# Patient Record
Sex: MALE | Race: WHITE | NOT HISPANIC OR LATINO | Employment: FULL TIME | URBAN - METROPOLITAN AREA
[De-identification: names, ages, dates, MRNs, and addresses within clinical notes are randomized per-mention and may not be internally consistent; named-entity substitution may affect disease eponyms.]

---

## 2022-08-16 ENCOUNTER — OCCUPATIONAL MEDICINE (OUTPATIENT)
Dept: URGENT CARE | Facility: CLINIC | Age: 62
End: 2022-08-16
Payer: COMMERCIAL

## 2022-08-16 VITALS
OXYGEN SATURATION: 97 % | BODY MASS INDEX: 25.41 KG/M2 | DIASTOLIC BLOOD PRESSURE: 64 MMHG | TEMPERATURE: 98.2 F | RESPIRATION RATE: 16 BRPM | SYSTOLIC BLOOD PRESSURE: 110 MMHG | HEART RATE: 74 BPM | HEIGHT: 72 IN | WEIGHT: 187.6 LBS

## 2022-08-16 DIAGNOSIS — S39.012A BACK STRAIN, INITIAL ENCOUNTER: ICD-10-CM
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
## 2022-08-16 DIAGNOSIS — M62.830 BACK MUSCLE SPASM: ICD-10-CM

## 2022-08-16 PROCEDURE — 99203 OFFICE O/P NEW LOW 30 MIN: CPT | Performed by: FAMILY MEDICINE

## 2022-08-16 NOTE — LETTER
[place] [address] [facility] [address] [place] [address]  Phone:  [phone] - Fax:  [phone]   [facility] Progress Report and Disability Certification  Date of Service: 8/16/2022   No Show:  No  Date / Time of Next Visit: 8/23/2022    Claim Information   Patient Name: Meliton Montiel  Claim Number:     Employer:   [employer] Date of Injury: 8/15/2022     Insurer / TPA: Ceasar Shetty  ID / SSN:     Occupation: Appliance tech  Diagnosis: Diagnoses of Work related injury, Back muscle spasm, and Back strain, initial encounter were pertinent to this visit.    Medical Information   Related to Industrial Injury? Yes    Subjective Complaints:  DOI: 8/15/2022  ALTA: He was bent over at work, swinging a hammer which he developed sudden low, left sided back pain. The pain is constant, it's described as stabbing and dully, currently rated 4/10. He hasn't tired anything for the pain. No numbness or weakness to his extremities x4. No loss of bowel or bladder function. Does have prior back injury, fell off some tall racks at work in 1980s. Treated with conservative measures. No secondary employment.    Objective Findings: No discolorations or deformities noted to inspection of back.  No step-offs or areas of tenderness to palpation of spine.  He is tender to palpation on his right, lower paraspinal region.  Equal strength and sensation to extremities x4.  Normal gait.   Pre-Existing Condition(s):     Assessment:   Initial Visit    Status: Additional Care Required  Permanent Disability:No    Plan:      Diagnostics:      Comments:  -Work restrictions include 0 hours of pushing/pulling daily and maximum lifting 10 lbs  -Heat, Tylenol, and ibuprofen as needed for symptomatic relief  -Perform the provided exercises 2 times daily    Disability Information   Status: Released to Restricted Duty    From:  8/16/2022  Through: 8/23/2022 Restrictions are: Temporary   Physical  Restrictions   Sitting:    Standing:    Stooping:    Bending:      Squatting:    Walking:    Climbing:    Pushin hrs/day   Pullin hrs/day Other:    Reaching Above Shoulder (L):   Reaching Above Shoulder (R):       Reaching Below Shoulder (L):    Reaching Below Shoulder (R):      Not to exceed Weight Limits   Carrying(hrs):   Weight Limit(lb): < or = to 10 pounds Lifting(hrs):   Weight  Limit(lb): < or = to 10 pounds   Comments:      Repetitive Actions   Hands: i.e. Fine Manipulations from Grasping:     Feet: i.e. Operating Foot Controls:     Driving / Operate Machinery:     Health Care Provider’s Original or Electronic Signature  Ximena Viveros M.D. Health Care Provider’s Original or Electronic Signature    Eliseo Rubio MD         Clinic Name / Location: [address] [address] Clinic Phone Number: Dept: [phone]   Appointment Time: 11:00 Am Visit Start Time: 11:30 AM   Check-In Time:  11:20 Am Visit Discharge Time:  11:59 AM   Original-Treating Physician or Chiropractor    Page 2-Insurer/TPA    Page 3-Employer    Page 4-Employee

## 2022-08-16 NOTE — PROGRESS NOTES
Subjective:     61 y.o. male presents for Other (NEW WC DOI: 8/15/22 Pt has back pain, numbness )      DOI: 8/15/2022  ALTA: He was bent over at work, swinging a hammer which he developed sudden low, left sided back pain. The pain is constant, it's described as stabbing and dully, currently rated 4/10. He hasn't tired anything for the pain. No numbness or weakness to his extremities x4. No loss of bowel or bladder function. Does have prior back injury, fell off some tall racks at work in 1980s. Treated with conservative measures. No secondary employment.     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  No step-offs or areas of tenderness to palpation of spine.  He is tender to palpation on his right, lower paraspinal region.  Equal strength and sensation to extremities x4.  Normal gait.    Assessment/Plan:     1. Work related injury    2. Back muscle spasm    3. Back strain, initial encounter  Released to Restricted Duty FROM 8/16/2022 TO 8/23/2022     -Work restrictions include 0 hours of pushing/pulling daily and maximum lifting 10 lbs  -Heat, Tylenol, and ibuprofen as needed for symptomatic relief  -Perform the provided exercises 2 times daily    Differential diagnosis, natural history, supportive care, and indications for immediate follow-up discussed.

## 2022-08-16 NOTE — LETTER
EMPLOYEE’S CLAIM FOR COMPENSATION/ REPORT OF INITIAL TREATMENT  FORM C-4    EMPLOYEE’S CLAIM - PROVIDE ALL INFORMATION REQUESTED   First Name  Meliton Last Name  Tiana Birthdate                    1960                Sex  male Claim Number (Insurer’s Use Only)    Home Address  [address] Age  61 y.o. Height  1.829 m (6') Weight  85.1 kg (187 lb 9.6 oz) [facility]     [facility] [facility] Zip  [address] Telephone  [phone] (home)    Mailing Address  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 (if applicable)  working on freezer    How did this injury or occupational disease occur? (Be specific an answer in detail. Use additional sheet if necessary)  I was working on the bottom of a freezer straightening a foot wiht a hammer when I swung it I felt a pop and then a sharp pain in lower back   If you believe that you have an occupational disease, when did you first have knowledge of the disability and it relationship to your employment?  N/A Witnesses to the Accident  None      Nature of Injury or Occupational Disease  Workers' Compensation  Part(s) of Body Injured or Affected  Lower Back Area (Lumbar Area & Lumbo-Sacral), ,     I certify that the above  is true and correct to the best of my knowledge and that I have provided this information in order to obtain the benefits of [place]  I hereby authorize any physician, chiropractor, surgeon, practitioner, or other person, any hospital, including [facility] or [facility], any medical service organization, any insurance company, or other institution or organization to release to each other, any medical or other information, including benefits paid or payable, pertinent to this injury or disease, except information relative to diagnosis, treatment and/or counseling for AIDS, psychological conditions, alcohol or controlled substances, for which I must give specific authorization.  A Photostat of this authorization shall be as valid as the original.     Date   Place Employee’s Original or  *Electronic Signature   THIS REPORT MUST BE COMPLETED AND MAILED WITHIN 3 WORKING DAYS OF TREATMENT   Place  [facility]  Name of [facility]   Date  8/16/2022 Diagnosis and Description of Injury or Occupational Disease  (Y99.0) Work related injury  (M62.830) Back muscle spasm  (S39.012A) Back strain, initial encounter Is there evidence the injured employee was under the influence of alcohol and/or another controlled substance at the time of accident?  ? No ? Yes (if yes, please explain)    Hour  11:30 AM   Diagnoses of Work related injury, Back muscle spasm, and Back strain, initial encounter were pertinent to this visit. No   Treatment  -Work restrictions include 0 hours of pushing/pulling daily and maximum lifting 10 lbs  -Heat, Tylenol, and ibuprofen as needed for symptomatic relief  -Perform the provided exercises 2 times daily  Have you advised the patient to remain off work five days or     more?    X-Ray Findings      ? Yes Indicate dates:   From   To      From information given by  "the employee, together with medical evidence, can        you directly connect this injury or occupational disease as job incurred?  Yes ? No If no, is the injured employee capable of:  ? full duty  No ? modified duty  Yes   Is additional medical care by a physician indicated?  Yes If Modified Duty, Specify any Limitations / Restrictions  Work restrictions include 0 hours of pushing/pulling daily and maximum lifting 10 lbs   Do you know of any previous injury or disease contributing to this condition or occupational disease?  ? Yes ? No (Explain if yes)                          No   Date  8/16/2022 Print Health Care Provider's   Mu Viveros M.D. I certify the employer’s copy of  this form was mailed on:   Address  [address] Insurer’s Use Only     [place] Zip  [address]    Provider’s Tax ID Number  [id number] Telephone  Dept: [phone]             Health Care Provider’s Original or Electronic Signature  e-MU Razo M.D. Degree (MD,DO, DC,PA-C,APRN)   MD      * Complete and attach Release of Information (Form C-4A) when injured employee signs C-4 Form electronically  ORIGINAL - TREATING HEALTHCARE PROVIDER PAGE 2 - INSURER/TPA PAGE 3 - EMPLOYER PAGE 4 - EMPLOYEE             Form C-4 (rev.08/21)           BRIEF DESCRIPTION OF RIGHTS AND BENEFITS  (Pursuant to [place]RS 616C.050)    Notice of Injury or Occupational Disease (Incident Report Form C-1): If an injury or occupational disease (OD) arises out of and in the course of employment, you must provide written notice to your employer as soon as practicable, but no later than 7 days after the accident or OD. Your employer shall maintain a sufficient supply of the required forms.    Claim for Compensation (Form C-4): If medical treatment is sought, the form C-4 is available at the place of initial treatment. A completed \"Claim for Compensation\" (Form C-4) must be filed within 90 days after an accident or OD. The treating physician or " chiropractor must, within 3 working days after treatment, complete and mail to the employer, the employer's insurer and third-party , the Claim for Compensation.    Medical Treatment: If you require medical treatment for your on-the-job injury or OD, you may be required to select a physician or chiropractor from a list provided by your workers’ compensation insurer, if it has contracted with an Organization for Managed Care (MCO) or Preferred Provider Organization (PPO) or providers of health care. If your employer has not entered into a contract with an MCO or PPO, you may select a physician or chiropractor from the Panel of Physicians and Chiropractors. Any medical costs related to your industrial injury or OD will be paid by your insurer.    Temporary Total Disability (TTD): If your doctor has certified that you are unable to work for a period of at least 5 consecutive days, or 5 cumulative days in a 20-day period, or places restrictions on you that your employer does not accommodate, you may be entitled to TTD compensation.    Temporary Partial Disability (TPD): If the wage you receive upon reemployment is less than the compensation for TTD to which you are entitled, the insurer may be required to pay you TPD compensation to make up the difference. TPD can only be paid for a maximum of 24 months.    Permanent Partial Disability (PPD): When your medical condition is stable and there is an indication of a PPD as a result of your injury or OD, within 30 days, your insurer must arrange for an evaluation by a rating physician or chiropractor to determine the degree of your PPD. The amount of your PPD award depends on the date of injury, the results of the PPD evaluation, your age and wage.    Permanent Total Disability (PTD): If you are medically certified by a treating physician or chiropractor as permanently and totally disabled and have been granted a PTD status by your insurer, you are entitled to  receive monthly benefits not to exceed 66 2/3% of your average monthly wage. The amount of your PTD payments is subject to reduction if you previously received a lump-sum PPD award.    Vocational Rehabilitation Services: You may be eligible for vocational rehabilitation services if you are unable to return to the job due to a permanent physical impairment or permanent restrictions as a result of your injury or occupational disease.    Transportation and Per Esther Reimbursement: You may be eligible for travel expenses and per esther associated with medical treatment.    Reopening: You may be able to reopen your claim if your condition worsens after claim closure.     Appeal Process: If you disagree with a written determination issued by the insurer or the insurer does not respond to your request, you may appeal to the Department of Administration, , by following the instructions contained in your determination letter. You must appeal the determination within 70 days from the date of the determination letter at [address], [address], [place], [place] [address], or [address], [place] [address]. If you disagree with the  decision, you may appeal to the Department of Administration, . You must file your appeal within 30 days from the date of the  decision letter at [address], [address], [place], [place] [address], or [address], [place] [address]. If you disagree with a decision of an , you may file a petition for judicial review with the District Court. You must do so within 30 days of the Appeal Officer’s decision. You may be represented by an  at your own expense or you may contact the [facility] for possible representation.    [place]  for Injured Workers (NAIW): If you disagree with a  decision, you may request that NAIW represent you without  charge at an  Hearing. For information regarding denial of benefits, you may contact the [facility] at: [address], [address], [place] [address], [phone], or [address] [facility] [address], [address], [place] [address], [phone]    To File a Complaint with the Division: If you wish to file a complaint with the  of the Division of Industrial Relations (DIR),  please contact the Workers’ Compensation Section, [address], [address], [place], [place] [address], telephone ([phone], or [address], [address], [place], [place] [address], telephone [phone].    For assistance with Workers’ Compensation Issues: You may contact the [facility] Office for Consumer Health Assistance, [address], [address], [place], [place] [address], Toll Free 1-635.790.1589, Web site: http://[facility].[place].gov/Programs/KANDACE E-mail: kandace@[facility].nv.[facility]              __________________________________________________________________                                    _________________            Employee Name / Signature                                                                                                                            Date                                                                                                                                                                                                                              D-2 (rev. 10/20)

## 2022-08-30 ENCOUNTER — OCCUPATIONAL MEDICINE (OUTPATIENT)
Dept: OCCUPATIONAL MEDICINE | Facility: CLINIC | Age: 62
End: 2022-08-30
Payer: COMMERCIAL

## 2022-08-30 VITALS
OXYGEN SATURATION: 96 % | HEART RATE: 81 BPM | SYSTOLIC BLOOD PRESSURE: 116 MMHG | RESPIRATION RATE: 16 BRPM | BODY MASS INDEX: 25.33 KG/M2 | DIASTOLIC BLOOD PRESSURE: 72 MMHG | HEIGHT: 72 IN | WEIGHT: 187 LBS

## 2022-08-30 DIAGNOSIS — S39.012D STRAIN OF LUMBAR REGION, SUBSEQUENT ENCOUNTER: ICD-10-CM

## 2022-08-30 PROCEDURE — 99202 OFFICE O/P NEW SF 15 MIN: CPT | Performed by: PREVENTIVE MEDICINE

## 2022-08-30 NOTE — LETTER
[address],   [address] [place] [address]  Phone:  [phone] - Fax:  [phone]   Occupational Health [facility] Progress Report and Disability Certification  Date of Service: 8/30/2022   No Show:  No  Date / Time of Next Visit:  Released from care   Claim Information   Patient Name: Meliton Montiel  Claim Number:     Employer:    Date of Injury: 8/15/2022     Insurer / TPA: Ceasar Shetty  ID / SSN:     Occupation: Appliance tech  Diagnosis: The encounter diagnosis was Strain of lumbar region, subsequent encounter.    Medical Information   Related to Industrial Injury? Yes    Subjective Complaints:  DOI: 8/15/2022: 62 yo injured worker presents with back injury. ALTA: He was bent over at work, swinging a hammer which he developed sudden low, left sided back pain. He was seen in [facility], advised NSAIDs and work restrictions.  Patient states overall he feels about 70 to 75% improved.  He states he did ice, heat and stretching which is helped significant relief.  Denies radiating pain, numbness or tingling.  He states he feels comfortable doing his full duty job but will take it easy over the next week or 2.   Objective Findings: Lumbar: No gross deform.  No tenderness.  Full range of motion.  Normal gait.   Pre-Existing Condition(s):     Assessment:   Condition Improved    Status: Discharged /  MMI  Permanent Disability:No    Plan:      Diagnostics:      Comments:  Expect gradual resolution of symptoms over the next few weeks  Released from care  Full duty  Follow-up as needed    Disability Information   Status: Released to Full Duty    From:  8/30/2022  Through:   Restrictions are:     Physical Restrictions   Sitting:    Standing:    Stooping:    Bending:      Squatting:    Walking:    Climbing:    Pushing:      Pulling:    Other:    Reaching Above Shoulder (L):   Reaching Above Shoulder (R):       Reaching Below Shoulder (L):    Reaching Below Shoulder (R):       Not to exceed Weight Limits   Carrying(hrs):   Weight Limit(lb):   Lifting(hrs):   Weight  Limit(lb):     Comments:      Repetitive Actions   Hands: i.e. Fine Manipulations from Grasping:     Feet: i.e. Operating Foot Controls:     Driving / Operate Machinery:     Health Care Provider’s Original or Electronic Signature  Ziggy Alejandro D.O. Health Care Provider’s Original or Electronic Signature    Eliseo Rubio MD         Clinic Name / Location: [address] [address] Clinic Phone Number: Dept: [phone]   Appointment Time: 2:45 Pm Visit Start Time: 2:40 PM   Check-In Time:  2:29 Pm Visit Discharge Time:  3:04PM   Original-Treating Physician or Chiropractor    Page 2-Insurer/TPA    Page 3-Employer    Page 4-Employee

## 2022-08-30 NOTE — PROGRESS NOTES
Subjective:     Meliton Montiel is a 61 y.o. male who presents for Follow-Up (WC DOI 8/15/22 back, better, rm 16)      DOI: 8/15/2022: 62 yo injured worker presents with back injury. ALTA: He was bent over at work, swinging a hammer which he developed sudden low, left sided back pain. He was seen in [facility], advised NSAIDs and work restrictions.  Patient states overall he feels about 70 to 75% improved.  He states he did ice, heat and stretching which is helped significant relief.  Denies radiating pain, numbness or tingling.  He states he feels comfortable doing his full duty job but will take it easy over the next week or 2.    ROS: All systems were reviewed on intake form, form was reviewed and signed. See scanned documents in media. Pertinent positives and negatives included in HPI.    PMH: No pertinent past medical history to this problem  MEDS: Medications were reviewed in Epic  ALLERGIES: No Known Allergies  SOCHX: Works as a appliance tech  FH: No pertinent family history to this problem       Objective:     /72   Pulse 81   Resp 16   Ht 1.829 m (6')   Wt 84.8 kg (187 lb)   SpO2 96%   BMI 25.36 kg/m²     Constitutional: Patient is in no acute distress. Appears well-developed and well-nourished.   HENT: Normocephalic and atraumatic. EOM are normal. No scleral icterus.   Cardiovascular: Normal rate.    Pulmonary/Chest: Effort normal. No respiratory distress.   Neurological: Patient is alert and oriented to person, place, and time.   Skin: Skin is warm and dry.   Psychiatric: Normal mood and affect. Behavior is normal.     Lumbar: No gross deform.  No tenderness.  Full range of motion.  Normal gait.    Assessment/Plan:       1. Strain of lumbar region, subsequent encounter    Released to Full Duty FROM 8/30/2022 TO       Expect gradual resolution of symptoms over the next few weeks  Released from care  Full duty  Follow-up as needed    Differential diagnosis, natural history, supportive care, and  indications for immediate follow-up discussed.    Approximately 25 minutes were spent in reviewing notes, preparing for visit, obtaining history, exam and evaluation, patient counseling/education and post visit documentation/orders.

## 2023-03-29 ENCOUNTER — OFFICE VISIT (OUTPATIENT)
Dept: URGENT CARE | Facility: CLINIC | Age: 63
End: 2023-03-29
Payer: COMMERCIAL

## 2023-03-29 VITALS
HEIGHT: 72 IN | BODY MASS INDEX: 23.7 KG/M2 | SYSTOLIC BLOOD PRESSURE: 152 MMHG | OXYGEN SATURATION: 96 % | DIASTOLIC BLOOD PRESSURE: 88 MMHG | RESPIRATION RATE: 16 BRPM | WEIGHT: 175 LBS | TEMPERATURE: 98 F | HEART RATE: 71 BPM

## 2023-03-29 DIAGNOSIS — R51.9 ACUTE NONINTRACTABLE HEADACHE, UNSPECIFIED HEADACHE TYPE: ICD-10-CM

## 2023-03-29 PROCEDURE — 99213 OFFICE O/P EST LOW 20 MIN: CPT | Performed by: PHYSICIAN ASSISTANT

## 2023-03-29 RX ORDER — PROMETHAZINE HYDROCHLORIDE 25 MG/1
25 TABLET ORAL EVERY 6 HOURS PRN
Qty: 30 TABLET | Refills: 0 | Status: SHIPPED | OUTPATIENT
Start: 2023-03-29

## 2023-03-29 RX ORDER — KETOROLAC TROMETHAMINE 30 MG/ML
30 INJECTION, SOLUTION INTRAMUSCULAR; INTRAVENOUS ONCE
Status: COMPLETED | OUTPATIENT
Start: 2023-03-29 | End: 2023-03-29

## 2023-03-29 RX ADMIN — KETOROLAC TROMETHAMINE 30 MG: 30 INJECTION, SOLUTION INTRAMUSCULAR; INTRAVENOUS at 13:25

## 2023-03-29 ASSESSMENT — ENCOUNTER SYMPTOMS
CHILLS: 0
SENSORY CHANGE: 0
FEVER: 0
LOSS OF CONSCIOUSNESS: 0
SPEECH CHANGE: 0
SEIZURES: 0
PHOTOPHOBIA: 1
DIZZINESS: 0
VOMITING: 0
NAUSEA: 1
HEADACHES: 1

## 2023-03-29 ASSESSMENT — VISUAL ACUITY: OU: 1

## 2023-03-29 NOTE — PROGRESS NOTES
Subjective:   Meliton Montiel  is a 62 y.o. male who presents for Migraine (Started 2 days ago )      Migraine    Patient presents urgent care complaining migraine headache.  Notes headache that is typical for his historical migraine headaches which is in temporal region primarily on right side but yesterday did have a period of time where it was bilateral.  Patient notes it is significantly more uncomfortable yesterday but today has improved with use of OTC Excedrin.  Patient denies numbness tingling or weakness.  Complains of photophobia and phonophobia associated with headache.  Complains of nausea without vomiting.  Patient notes past medical history of resolution of headaches over around 4 to 6 hours with OTC treatment.  This headaches been waxing waning for the last over 2 days.  Patient is recently acquired medical insurance would like referral to establish with PCP.    Review of Systems   Constitutional:  Negative for chills and fever.   Eyes:  Positive for photophobia.   Gastrointestinal:  Positive for nausea. Negative for vomiting.   Neurological:  Positive for headaches. Negative for dizziness, sensory change, speech change, seizures and loss of consciousness.     No Known Allergies     Objective:   BP (!) 152/88 (Patient Position: Sitting, BP Cuff Size: Adult)   Pulse 71   Temp 36.7 °C (98 °F) (Temporal)   Resp 16   Ht 1.829 m (6')   Wt 79.4 kg (175 lb)   SpO2 96%   BMI 23.73 kg/m²      Physical Exam  Vitals and nursing note reviewed.   Constitutional:       General: He is not in acute distress.     Appearance: Normal appearance. He is well-developed. He is not diaphoretic.   HENT:      Head: Normocephalic and atraumatic.      Right Ear: External ear normal.      Left Ear: External ear normal.      Nose: Nose normal.   Eyes:      General: Lids are normal. Vision grossly intact. No scleral icterus.        Right eye: No discharge.         Left eye: No discharge.      Extraocular Movements: Extraocular  movements intact.      Conjunctiva/sclera: Conjunctivae normal.      Right eye: Right conjunctiva is not injected. No exudate or hemorrhage.     Left eye: Left conjunctiva is not injected. No exudate or hemorrhage.     Pupils: Pupils are equal, round, and reactive to light.   Pulmonary:      Effort: Pulmonary effort is normal. No respiratory distress.   Musculoskeletal:         General: Normal range of motion.      Cervical back: Neck supple.   Skin:     General: Skin is warm and dry.      Coloration: Skin is not pale.   Neurological:      General: No focal deficit present.      Mental Status: He is alert and oriented to person, place, and time. He is not disoriented.      Cranial Nerves: Cranial nerves 2-12 are intact. No cranial nerve deficit or facial asymmetry.      Sensory: Sensation is intact. No sensory deficit.      Motor: Motor function is intact.      Coordination: Coordination is intact. Coordination normal.      Gait: Gait is intact. Gait normal.   Toradol 30 mg IM-tolerates well    Assessment/Plan:   1. Acute nonintractable headache, unspecified headache type  - ketorolac (TORADOL) injection 30 mg  - promethazine (PHENERGAN) 25 MG Tab; Take 1 Tablet by mouth every 6 hours as needed for Nausea/Vomiting.  Dispense: 30 Tablet; Refill: 0  - Referral to establish with [facility] PCP  Supportive care is reviewed with patient/caregiver - recommend to push PO fluids and electrolytes, Cautioned regarding potential for sedation with medication.  Patient declines work note.  Okay to use OTC anti-inflammatory as well as Rx Phenergan for future headaches.  Follow-up with PCP.  ER precautions reviewed.  ER precautions with any worsening symptoms are reviewed with patient/caregiver and they do express understanding    I have worn an N95 mask, gloves and eye protection for the entire encounter with this patient.     Differential diagnosis, natural history, supportive care, and indications for immediate follow-up  discussed.